# Patient Record
(demographics unavailable — no encounter records)

---

## 2025-07-12 NOTE — LETTER BODY
[FreeTextEntry1] :  Brigette Carmona MD [address] [place] [id number] [phone]    Dear Dr. Carmona,    Reason for visit: Bilateral renal stones.    This is a 63 year-old Mandarin-speaking woman with chronic hepatitis B, presenting with bilateral renal stones. Previous CT scans in 2015 and 2017 demonstrated 4-5 bilateral renal calculi, measured up to 4 mm. Patient's renal ultrasound in August 2020 demonstrated stable bilateral renal stones. Her renal ultrasound in May 2021 demonstrated stable bilateral stones, 4 mm on the right side and 5 mm on the left. Her renal ultrasound in June 2023 demonstrated stable bilateral stones, 4 mm on the right side and 5~6 mm on the left. Her 2024 renal ultrasound was stable. The patient returns today for follow-up and renal ultrasound. Ultrasound today demonstrates stable bilateral stones. Since she was last seen, the patient denies any interval urinary complaint or difficulties. She is overall well. Patient denies any flank pain, hematuria, or urinary incontinence. The patient is entirely asymptomatic. The past medical history, family history and social history are unchanged. All other review of systems are negative. Patient denies any changes in medications. Medication list was reconciled.    On examination, the patient is a healthy-appearing woman in no acute distress. She is alert and oriented follows commands. She has normal mood and affect. She is normocephalic. Neck is supple. Respirations are unlabored. Abdomen is soft and nontender. Bladder is nonpalpable. No CVA tenderness. Neurologically she is grossly intact. No peripheral edema. Skin without gross abnormality.    I personally reviewed ultrasound images with the patient today and images demonstrated again, multiple echogenic foci with twinkle noted in both kidneys, largest in the right kidney 4 mm, 5 mm in left kidney. Both kidneys appear normal in size and echogenicity, no solid masses or hydronephrosis visualized.    Assessment: Bilateral renal stones, stable.    I counseled the patient. Her renal ultrasound today demonstrated stable bilateral renal stones, stable in size. Given the stable stones, the patient declined intervention. I encouraged the patient to maintain a low-protein low-sodium diet. I also encouraged hydration to prevent stone formation. I encouraged the patient to follow-up in 1 year for repeat renal ultrasound. Risks and alternatives were discussed. I answered the patient questions. The patient will follow-up as directed and will contact me with any questions or concerns. Thank you for the opportunity to participate in the care of Ms. STEIN. I will keep you updated on her progress.  Patient will continue longitudinal care for her complex and serious chronic condition.  Plan: Stone diet. Hydration. Follow-up in 1 year for repeat renal ultrasound.

## 2025-07-12 NOTE — LETTER BODY
[FreeTextEntry1] :  Brigette Carmona MD [address] [place] [id number] [phone]    Dear Dr. Carmona,    Reason for visit: Bilateral renal stones.    This is a 63 year-old Mandarin-speaking woman with chronic hepatitis B, presenting with bilateral renal stones. Previous CT scans in 2015 and 2017 demonstrated 4-5 bilateral renal calculi, measured up to 4 mm. Patient's renal ultrasound in August 2020 demonstrated stable bilateral renal stones. Her renal ultrasound in May 2021 demonstrated stable bilateral stones, 4 mm on the right side and 5 mm on the left. Her renal ultrasound in June 2023 demonstrated stable bilateral stones, 4 mm on the right side and 5~6 mm on the left. Her 2024 renal ultrasound was stable. The patient returns today for follow-up and renal ultrasound. Ultrasound today demonstrates stable bilateral stones. Since she was last seen, the patient denies any interval urinary complaint or difficulties. She is overall well. Patient denies any flank pain, hematuria, or urinary incontinence. The patient is entirely asymptomatic. The past medical history, family history and social history are unchanged. All other review of systems are negative. Patient denies any changes in medications. Medication list was reconciled.    On examination, the patient is a healthy-appearing woman in no acute distress. She is alert and oriented follows commands. She has normal mood and affect. She is normocephalic. Neck is supple. Respirations are unlabored. Abdomen is soft and nontender. Bladder is nonpalpable. No CVA tenderness. Neurologically she is grossly intact. No peripheral edema. Skin without gross abnormality.    I personally reviewed ultrasound images with the patient today and images demonstrated again, multiple echogenic foci with twinkle noted in both kidneys, largest in the right kidney 4 mm, 5 mm in left kidney. Both kidneys appear normal in size and echogenicity, no solid masses or hydronephrosis visualized.    Assessment: Bilateral renal stones, stable.    I counseled the patient. Her renal ultrasound today demonstrated stable bilateral renal stones, stable in size. Given the stable stones, the patient declined intervention. I encouraged the patient to maintain a low-protein low-sodium diet. I also encouraged hydration to prevent stone formation. I encouraged the patient to follow-up in 1 year for repeat renal ultrasound. Risks and alternatives were discussed. I answered the patient questions. The patient will follow-up as directed and will contact me with any questions or concerns. Thank you for the opportunity to participate in the care of Ms. STEIN. I will keep you updated on her progress.  Patient will continue longitudinal care for her complex and serious chronic condition.  Plan: Stone diet. Hydration. Follow-up in 1 year for repeat renal ultrasound.

## 2025-07-12 NOTE — ADDENDUM
[FreeTextEntry1] : Entered by Jazmyn Kerns, acting as scribe for Dr Winston Weston. The documentation recorded by the scribe accurately reflects the service I personally performed and the decisions made by me.